# Patient Record
Sex: FEMALE | Race: WHITE | Employment: UNEMPLOYED | URBAN - METROPOLITAN AREA
[De-identification: names, ages, dates, MRNs, and addresses within clinical notes are randomized per-mention and may not be internally consistent; named-entity substitution may affect disease eponyms.]

---

## 2017-02-28 RX ORDER — LEVONORGESTREL AND ETHINYL ESTRADIOL 0.1-0.02MG
KIT ORAL
Qty: 84 TABLET | Refills: 0 | OUTPATIENT
Start: 2017-02-28

## 2017-03-07 RX ORDER — LEVONORGESTREL AND ETHINYL ESTRADIOL 0.1-0.02MG
1 KIT ORAL DAILY
Qty: 3 PACKAGE | Refills: 0 | Status: SHIPPED | OUTPATIENT
Start: 2017-03-07 | End: 2017-04-17

## 2017-03-07 NOTE — TELEPHONE ENCOUNTER
pt needs refill on perscription, pt going back to school and wont be back until April and needs medication refilled

## 2017-04-17 ENCOUNTER — OFFICE VISIT (OUTPATIENT)
Dept: OBGYN CLINIC | Facility: CLINIC | Age: 22
End: 2017-04-17

## 2017-04-17 VITALS
DIASTOLIC BLOOD PRESSURE: 70 MMHG | HEART RATE: 16 BPM | HEIGHT: 64.5 IN | RESPIRATION RATE: 16 BRPM | SYSTOLIC BLOOD PRESSURE: 110 MMHG | WEIGHT: 138 LBS | BODY MASS INDEX: 23.27 KG/M2

## 2017-04-17 DIAGNOSIS — Z01.419 ENCOUNTER FOR WELL WOMAN EXAM WITH ROUTINE GYNECOLOGICAL EXAM: Primary | ICD-10-CM

## 2017-04-17 DIAGNOSIS — Z11.3 SCREENING EXAMINATION FOR VENEREAL DISEASE: ICD-10-CM

## 2017-04-17 PROCEDURE — 87591 N.GONORRHOEAE DNA AMP PROB: CPT | Performed by: OBSTETRICS & GYNECOLOGY

## 2017-04-17 PROCEDURE — 88175 CYTOPATH C/V AUTO FLUID REDO: CPT | Performed by: OBSTETRICS & GYNECOLOGY

## 2017-04-17 PROCEDURE — 99395 PREV VISIT EST AGE 18-39: CPT | Performed by: OBSTETRICS & GYNECOLOGY

## 2017-04-17 PROCEDURE — 87491 CHLMYD TRACH DNA AMP PROBE: CPT | Performed by: OBSTETRICS & GYNECOLOGY

## 2017-04-17 RX ORDER — LEVONORGESTREL AND ETHINYL ESTRADIOL 0.1-0.02MG
1 KIT ORAL DAILY
Qty: 3 PACKAGE | Refills: 3 | Status: SHIPPED | OUTPATIENT
Start: 2017-04-17 | End: 2019-06-19

## 2017-04-17 NOTE — PROGRESS NOTES
Alethea Harp is a 24year old female [facility] Patient's last menstrual period was 04/03/2017 (exact date). Patient presents with:  Wellness Visit: annual  .  No complaints, menses ar every light on OCP.  Just graduated from college, lives in [place]-  Allergies      Review of Systems:  Constitutional:  Denies fatigue, night sweats, hot flashes  Eyes:  denies blurred or double vision  Cardiovascular:  denies chest pain or palpitations  Respiratory:  denies shortness of breath  Gastrointestinal:  denies h Ascus or PAP Neg; Future  -     Chlamydia/Gc Amplification    Other orders  -     Levonorgestrel-Ethinyl Estrad (LUTERA) 0.1-20 MG-MCG Oral Tab; Take 1 tablet by mouth daily.

## 2017-08-23 ENCOUNTER — TELEPHONE (OUTPATIENT)
Dept: OBGYN CLINIC | Facility: CLINIC | Age: 22
End: 2017-08-23

## 2017-08-23 NOTE — TELEPHONE ENCOUNTER
Patient concern that her pharmacy change her OCP from [facility] to [address] [place] [address]. Patient informed that both medications have the same hormonal content. Patient verbalizes understanding.

## 2017-08-23 NOTE — TELEPHONE ENCOUNTER
Per pt she would like to talk to a nurse because she has a question about her bc medication, please call pt.  Thanks

## 2017-10-30 RX ORDER — LEVONORGESTREL AND ETHINYL ESTRADIOL 0.1-0.02MG
1 KIT ORAL DAILY
Qty: 84 TABLET | Refills: 0 | OUTPATIENT
Start: 2017-10-30

## 2017-11-09 ENCOUNTER — TELEPHONE (OUTPATIENT)
Dept: OBGYN CLINIC | Facility: CLINIC | Age: 22
End: 2017-11-09

## 2017-11-09 RX ORDER — LEVONORGESTREL AND ETHINYL ESTRADIOL 0.1-0.02MG
1 KIT ORAL DAILY
Qty: 84 TABLET | Refills: 1 | Status: SHIPPED | OUTPATIENT
Start: 2017-11-09 | End: 2019-06-19

## 2017-11-23 ENCOUNTER — HOSPITAL ENCOUNTER (EMERGENCY)
Facility: HOSPITAL | Age: 22
Discharge: HOME | End: 2017-11-23
Attending: STUDENT IN AN ORGANIZED HEALTH CARE EDUCATION/TRAINING PROGRAM
Payer: COMMERCIAL

## 2017-11-23 VITALS
HEART RATE: 77 BPM | HEIGHT: 64 IN | BODY MASS INDEX: 22.2 KG/M2 | DIASTOLIC BLOOD PRESSURE: 72 MMHG | OXYGEN SATURATION: 100 % | RESPIRATION RATE: 16 BRPM | SYSTOLIC BLOOD PRESSURE: 126 MMHG | TEMPERATURE: 98 F | WEIGHT: 130 LBS

## 2017-11-23 DIAGNOSIS — R11.2 NAUSEA AND VOMITING IN ADULT: Primary | ICD-10-CM

## 2017-11-23 DIAGNOSIS — E86.0 DEHYDRATION: ICD-10-CM

## 2017-11-23 DIAGNOSIS — R10.9 ABDOMINAL PAIN OF UNKNOWN ETIOLOGY: ICD-10-CM

## 2017-11-23 PROCEDURE — 80053 COMPREHEN METABOLIC PANEL: CPT | Performed by: STUDENT IN AN ORGANIZED HEALTH CARE EDUCATION/TRAINING PROGRAM

## 2017-11-23 PROCEDURE — 81025 URINE PREGNANCY TEST: CPT

## 2017-11-23 PROCEDURE — S0028 INJECTION, FAMOTIDINE, 20 MG: HCPCS | Performed by: STUDENT IN AN ORGANIZED HEALTH CARE EDUCATION/TRAINING PROGRAM

## 2017-11-23 PROCEDURE — 96374 THER/PROPH/DIAG INJ IV PUSH: CPT

## 2017-11-23 PROCEDURE — 93005 ELECTROCARDIOGRAM TRACING: CPT

## 2017-11-23 PROCEDURE — 93010 ELECTROCARDIOGRAM REPORT: CPT

## 2017-11-23 PROCEDURE — 81003 URINALYSIS AUTO W/O SCOPE: CPT | Performed by: STUDENT IN AN ORGANIZED HEALTH CARE EDUCATION/TRAINING PROGRAM

## 2017-11-23 PROCEDURE — 96375 TX/PRO/DX INJ NEW DRUG ADDON: CPT

## 2017-11-23 PROCEDURE — 99284 EMERGENCY DEPT VISIT MOD MDM: CPT

## 2017-11-23 PROCEDURE — 83690 ASSAY OF LIPASE: CPT | Performed by: STUDENT IN AN ORGANIZED HEALTH CARE EDUCATION/TRAINING PROGRAM

## 2017-11-23 PROCEDURE — 96361 HYDRATE IV INFUSION ADD-ON: CPT

## 2017-11-23 PROCEDURE — 99285 EMERGENCY DEPT VISIT HI MDM: CPT

## 2017-11-23 PROCEDURE — 85025 COMPLETE CBC W/AUTO DIFF WBC: CPT | Performed by: STUDENT IN AN ORGANIZED HEALTH CARE EDUCATION/TRAINING PROGRAM

## 2017-11-23 RX ORDER — FAMOTIDINE 20 MG/1
20 TABLET ORAL 2 TIMES DAILY PRN
Qty: 30 TABLET | Refills: 0 | Status: SHIPPED | OUTPATIENT
Start: 2017-11-23 | End: 2017-12-23

## 2017-11-23 RX ORDER — ONDANSETRON 4 MG/1
4 TABLET, ORALLY DISINTEGRATING ORAL EVERY 8 HOURS PRN
Qty: 10 TABLET | Refills: 0 | Status: SHIPPED | OUTPATIENT
Start: 2017-11-23 | End: 2017-11-30

## 2017-11-23 RX ORDER — ONDANSETRON 2 MG/ML
4 INJECTION INTRAMUSCULAR; INTRAVENOUS ONCE
Status: COMPLETED | OUTPATIENT
Start: 2017-11-23 | End: 2017-11-23

## 2017-11-23 RX ORDER — DICYCLOMINE HCL 20 MG
20 TABLET ORAL 4 TIMES DAILY PRN
Qty: 30 TABLET | Refills: 0 | Status: SHIPPED | OUTPATIENT
Start: 2017-11-23 | End: 2017-12-23

## 2017-11-23 RX ORDER — FAMOTIDINE 10 MG/ML
20 INJECTION, SOLUTION INTRAVENOUS ONCE
Status: COMPLETED | OUTPATIENT
Start: 2017-11-23 | End: 2017-11-23

## 2017-11-23 RX ORDER — OMEPRAZOLE 20 MG/1
20 CAPSULE, DELAYED RELEASE ORAL
COMMUNITY
End: 2018-07-02

## 2017-11-23 NOTE — ED PROVIDER NOTES
Patient Seen in: [facility] Emergency Department    History   Patient presents with:  Syncope (cardiovascular, neurologic)  Nausea/Vomiting/Diarrhea (gastrointestinal)    Stated Complaint: syncopal    HPI    Patient is a 78-year-old female presentin Resp 16   Ht 162.6 cm (5' 4\")   Wt 59 kg   LMP 11/16/2017   SpO2 100%   BMI 22.31 kg/m²         Physical Exam    Constitutional: oriented to person, place, and time, appears well-developed and well-nourished. HENT:   Head: Normocephalic and atraumatic. axes as noted on EKG Report. Rate: 72  Rhythm: Sinus Rhythm  Reading: Sinus rhythm with occasional PVCs. Normal intervals, normal axis, no ST or T-wave abnormalities noted. No previous EKG available for comparison.            ED Course as of Nov 23 0513 soon as possible for a visit          Medications Prescribed:  Current Discharge Medication List    START taking these medications    ondansetron 4 MG Oral Tablet Dispersible  Take 1 tablet (4 mg total) by mouth every 8 (eight) hours as needed.   Qty: 10 ta

## 2018-01-02 PROBLEM — K21.9 GASTROESOPHAGEAL REFLUX DISEASE WITHOUT ESOPHAGITIS: Status: ACTIVE | Noted: 2018-01-02

## 2018-04-18 ENCOUNTER — TELEPHONE (OUTPATIENT)
Dept: OBGYN CLINIC | Facility: CLINIC | Age: 23
End: 2018-04-18

## 2018-04-18 RX ORDER — LEVONORGESTREL AND ETHINYL ESTRADIOL 0.1-0.02MG
1 KIT ORAL DAILY
Qty: 84 TABLET | Refills: 0 | Status: SHIPPED | OUTPATIENT
Start: 2018-04-18 | End: 2019-06-19

## 2018-04-18 NOTE — TELEPHONE ENCOUNTER
Per pt she just made her wwe appt to see dr EDMOND on 7-2-18, however she lives in [place] and is not coming to town before her appt. She will run out of her bc meds and she is wondering if we could give her refills for her bc until she comes home.  [employer]

## 2018-07-02 ENCOUNTER — OFFICE VISIT (OUTPATIENT)
Dept: OBGYN CLINIC | Facility: CLINIC | Age: 23
End: 2018-07-02

## 2018-07-02 VITALS
DIASTOLIC BLOOD PRESSURE: 60 MMHG | WEIGHT: 135 LBS | HEART RATE: 80 BPM | SYSTOLIC BLOOD PRESSURE: 102 MMHG | HEIGHT: 65 IN | BODY MASS INDEX: 22.49 KG/M2

## 2018-07-02 DIAGNOSIS — Z01.419 ENCOUNTER FOR WELL WOMAN EXAM WITH ROUTINE GYNECOLOGICAL EXAM: Primary | ICD-10-CM

## 2018-07-02 DIAGNOSIS — Z12.4 CERVICAL CANCER SCREENING: ICD-10-CM

## 2018-07-02 PROCEDURE — 88175 CYTOPATH C/V AUTO FLUID REDO: CPT | Performed by: OBSTETRICS & GYNECOLOGY

## 2018-07-02 PROCEDURE — 87491 CHLMYD TRACH DNA AMP PROBE: CPT | Performed by: OBSTETRICS & GYNECOLOGY

## 2018-07-02 PROCEDURE — 99395 PREV VISIT EST AGE 18-39: CPT | Performed by: OBSTETRICS & GYNECOLOGY

## 2018-07-02 PROCEDURE — 87591 N.GONORRHOEAE DNA AMP PROB: CPT | Performed by: OBSTETRICS & GYNECOLOGY

## 2018-07-02 RX ORDER — NORETHINDRONE ACETATE AND ETHINYL ESTRADIOL 1MG-20(21)
1 KIT ORAL DAILY
Qty: 3 PACKAGE | Refills: 3 | Status: SHIPPED | OUTPATIENT
Start: 2018-07-02 | End: 2019-06-19

## 2018-07-02 NOTE — PROGRESS NOTES
Eugenia Carrion is a 20year old female [facility] Patient's last menstrual period was 06/26/2018 (exact date). Patient presents with:  Wellness Visit  . Patient leaves in [place], c/o pharmacy always changing her pills that causing acne and  BTB.  Discuss 0.1-20 MG-MCG Oral Tab, Take 1 tablet by mouth daily. , Disp: 84 tablet, Rfl: 0  •  Levonorgestrel-Ethinyl Estrad (AVIANE) 0.1-20 MG-MCG Oral Tab, Take 1 tablet by mouth daily. , Disp: 84 tablet, Rfl: 1  •  Levonorgestrel-Ethinyl Estrad (LUTERA) 0.1-20 MG-MC tenderness on motion  Uterus: normal in size, contour, position, mobility, without tenderness  Adnexa: normal without masses or tenderness  Perineum: normal  Anus: no hemorroids     Assessment & Plan:  Diagnoses and all orders for this visit:    Encounter

## 2018-07-03 LAB
C TRACH DNA SPEC QL NAA+PROBE: NEGATIVE
N GONORRHOEA DNA SPEC QL NAA+PROBE: NEGATIVE

## 2018-07-05 NOTE — PROGRESS NOTES
Called pt and LMOVM with normal results per Hippa. Left call back number for any further question or concerns.

## 2019-04-01 ENCOUNTER — TELEPHONE (OUTPATIENT)
Dept: OBGYN CLINIC | Facility: CLINIC | Age: 24
End: 2019-04-01

## 2019-04-01 RX ORDER — NORETHINDRONE ACETATE AND ETHINYL ESTRADIOL 1MG-20(21)
1 KIT ORAL DAILY
Qty: 3 PACKAGE | Refills: 0 | Status: SHIPPED | OUTPATIENT
Start: 2019-04-01 | End: 2019-06-19

## 2019-04-01 NOTE — TELEPHONE ENCOUNTER
Patient calling and states she would like a refill on bc sent to new pharm on file.  ([facility] in [place])

## 2019-06-19 RX ORDER — NORETHINDRONE ACETATE AND ETHINYL ESTRADIOL AND FERROUS FUMARATE 1MG-20(21)
KIT ORAL
Qty: 84 TABLET | Refills: 0 | Status: SHIPPED | OUTPATIENT
Start: 2019-06-19 | End: 2019-09-06

## 2019-09-06 ENCOUNTER — OFFICE VISIT (OUTPATIENT)
Dept: OBGYN CLINIC | Facility: CLINIC | Age: 24
End: 2019-09-06
Payer: COMMERCIAL

## 2019-09-06 VITALS
DIASTOLIC BLOOD PRESSURE: 60 MMHG | HEIGHT: 65 IN | WEIGHT: 141.63 LBS | HEART RATE: 85 BPM | BODY MASS INDEX: 23.6 KG/M2 | SYSTOLIC BLOOD PRESSURE: 104 MMHG

## 2019-09-06 DIAGNOSIS — Z01.419 ENCOUNTER FOR WELL WOMAN EXAM WITH ROUTINE GYNECOLOGICAL EXAM: Primary | ICD-10-CM

## 2019-09-06 DIAGNOSIS — Z12.4 CERVICAL CANCER SCREENING: ICD-10-CM

## 2019-09-06 PROCEDURE — 87591 N.GONORRHOEAE DNA AMP PROB: CPT | Performed by: OBSTETRICS & GYNECOLOGY

## 2019-09-06 PROCEDURE — 87491 CHLMYD TRACH DNA AMP PROBE: CPT | Performed by: OBSTETRICS & GYNECOLOGY

## 2019-09-06 PROCEDURE — 99395 PREV VISIT EST AGE 18-39: CPT | Performed by: OBSTETRICS & GYNECOLOGY

## 2019-09-06 PROCEDURE — 88175 CYTOPATH C/V AUTO FLUID REDO: CPT | Performed by: OBSTETRICS & GYNECOLOGY

## 2019-09-06 RX ORDER — ESCITALOPRAM OXALATE 20 MG/1
20 TABLET ORAL DAILY
COMMUNITY

## 2019-09-06 RX ORDER — NORETHINDRONE ACETATE AND ETHINYL ESTRADIOL 1MG-20(21)
1 KIT ORAL
Qty: 84 TABLET | Refills: 3 | Status: SHIPPED | OUTPATIENT
Start: 2019-09-06 | End: 2019-12-02

## 2019-09-06 NOTE — PROGRESS NOTES
Molina Gimenez is a 24year old female  No LMP recorded. Patient presents with:  Wellness Visit  . 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 Skin/Breast:  Denies any breast pain, lumps, or discharge. Neurological:  denies headaches, extremity weakness or numbness. Psychiatric: denies depression or anxiety.   Endocrine:   denies excessive t

## 2019-09-09 LAB
C TRACH DNA SPEC QL NAA+PROBE: NEGATIVE
N GONORRHOEA DNA SPEC QL NAA+PROBE: NEGATIVE

## 2019-12-02 ENCOUNTER — TELEPHONE (OUTPATIENT)
Dept: OBGYN CLINIC | Facility: CLINIC | Age: 24
End: 2019-12-02

## 2019-12-02 RX ORDER — NORETHINDRONE ACETATE AND ETHINYL ESTRADIOL 1MG-20(21)
1 KIT ORAL
Qty: 84 TABLET | Refills: 2 | Status: SHIPPED | OUTPATIENT
Start: 2019-12-02 | End: 2020-08-09

## 2019-12-02 NOTE — TELEPHONE ENCOUNTER
Patient requesting refill on BC pills (junel) to be sent to pharm on file.  Pt is up to date on annual exam

## 2019-12-02 NOTE — TELEPHONE ENCOUNTER
Patient recently moved. [place] 9/6/19. Re ordered Junel to pharmacy of choice in [place]. Patient aware. Understanding verbalized.

## 2020-08-09 RX ORDER — NORETHINDRONE ACETATE AND ETHINYL ESTRADIOL 1MG-20(21)
KIT ORAL
Qty: 84 TABLET | Refills: 0 | Status: SHIPPED | OUTPATIENT
Start: 2020-08-09

## (undated) NOTE — MR AVS SNAPSHOT
After Visit Summary   4/17/2017    Shirley Keene    MRN: [id number]           Visit Information        Provider Department Dept Phone    4/17/2017  1:00 PM [facility], DO Jose A Shea [phone]      Your Vitals Were     BP Pulse Res How Do I Sign Up? In your Internet browser, go to [email or web address]    Click on the Activate your Account if you have an activation code in the box under the *New User? section.      Enter your Conjectur Activation Code exactly as it will help us do so. For more information on [employer], please visit [email or web address]

## (undated) NOTE — ED AVS SNAPSHOT
Krystyna Keanrs   MRN: [id number]    Department:  [facility] Emergency Department   Date of Visit:  11/23/2017           Disclosure     Insurance plans vary and the physician(s) referred by the ER may not be covered by your plan.  Please contact tell this physician (or your personal doctor if your instructions are to return to your personal doctor) about any new or lasting problems. The primary care or specialist physician will see patients referred from the [facility] Emergency Department.  Delmy Mejía

## (undated) NOTE — MR AVS SNAPSHOT
After Visit Summary   9/6/2019    Ricardo Olivarez    MRN: [id number]           Visit Information     Date & Time  9/6/2019 12:45 PM Provider  Aiden Hawkins DO [facility]  [address]  Dept.  Phone  [phone]      Your Vi 1. In your Internet browser, go to [email or web address]  2. Click on the Activate your Account if you have an activation code in the box under the *New User? section. 3. Enter your artandseek Activation Code exactly as it appears below.  You will help us do so. For more information on [employer], please visit [email or web address]                   DO YOU KNOW WHERE TO GO? Injury & illness are never convenient.  If you are dealing with a   non-emergency, consider your o

## (undated) NOTE — MR AVS SNAPSHOT
Carlos Bean  [address], [facility] [address]  [address] [phone]               Thank you for choosing us for your health care visit with Dequan Hector DO.   We are glad to serve you and happy to provide you with this Take 1 capsule (100 mg total) by mouth 2 (two) times daily.    Commonly known as:  MINOCIN,DYNACIN           tretinoin 0.025 % Crea   APPLY TO FACE EVERY DAY   Commonly known as:  RETIN-A                Where to Get Your Medications      These medications w